# Patient Record
Sex: FEMALE | Race: WHITE | NOT HISPANIC OR LATINO
[De-identification: names, ages, dates, MRNs, and addresses within clinical notes are randomized per-mention and may not be internally consistent; named-entity substitution may affect disease eponyms.]

---
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
## 2022-01-24 ENCOUNTER — APPOINTMENT (OUTPATIENT)
Dept: PEDIATRICS | Facility: CLINIC | Age: 22
End: 2022-01-24
Payer: COMMERCIAL

## 2022-01-24 VITALS — DIASTOLIC BLOOD PRESSURE: 60 MMHG | SYSTOLIC BLOOD PRESSURE: 120 MMHG | TEMPERATURE: 98.9 F | WEIGHT: 133 LBS

## 2022-01-24 DIAGNOSIS — R31.9 HEMATURIA, UNSPECIFIED: ICD-10-CM

## 2022-01-24 DIAGNOSIS — Z87.898 PERSONAL HISTORY OF OTHER SPECIFIED CONDITIONS: ICD-10-CM

## 2022-01-24 DIAGNOSIS — R30.0 DYSURIA: ICD-10-CM

## 2022-01-24 LAB
BILIRUB UR QL STRIP: NORMAL
CLARITY UR: CLEAR
COLLECTION METHOD: NORMAL
GLUCOSE UR-MCNC: NORMAL
HCG UR QL: 0.2 EU/DL
HGB UR QL STRIP.AUTO: NORMAL
KETONES UR-MCNC: NORMAL
LEUKOCYTE ESTERASE UR QL STRIP: NORMAL
NITRITE UR QL STRIP: NORMAL
PH UR STRIP: 6
PROT UR STRIP-MCNC: NORMAL
SP GR UR STRIP: 1.01

## 2022-01-24 PROCEDURE — 81003 URINALYSIS AUTO W/O SCOPE: CPT | Mod: QW

## 2022-01-24 PROCEDURE — 99213 OFFICE O/P EST LOW 20 MIN: CPT | Mod: 25

## 2022-01-24 NOTE — DISCUSSION/SUMMARY
[FreeTextEntry1] : abx started due to high clinical suspicion\par start azo for dysuria\par drink plenty of water\par if worsening rto/ er\par ucx sent\par \par PATIENT MADE AWARE WAY OVERDUE AND NEEDS PHYSICAL HERE. \par SAYS WILL SCHEDULE ONE.

## 2022-01-24 NOTE — HISTORY OF PRESENT ILLNESS
[de-identified] : 21yr old f c/o burning when urinates for 2 days. [FreeTextEntry6] : pain on urination x 2 days\par couldn't go to class since so uncomfortable\par when voids feels like has to void again\par mild abdominal pain\par does not have her period right now\par no new vaginal discharge\par no h/o UTI\par no vaginal pain or itch

## 2022-01-24 NOTE — REVIEW OF SYSTEMS
[Dysuria] : dysuria [Hematuria] : hematuria [Vaginal Dischage] : no vaginal discharge [Vaginal Itch] : no vaginal itch [Negative] : Genitourinary

## 2022-01-25 ENCOUNTER — NON-APPOINTMENT (OUTPATIENT)
Age: 22
End: 2022-01-25

## 2022-04-22 ENCOUNTER — APPOINTMENT (OUTPATIENT)
Dept: PEDIATRICS | Facility: CLINIC | Age: 22
End: 2022-04-22
Payer: COMMERCIAL

## 2022-04-22 VITALS — TEMPERATURE: 98.3 F | WEIGHT: 132.3 LBS

## 2022-04-22 DIAGNOSIS — R10.9 UNSPECIFIED ABDOMINAL PAIN: ICD-10-CM

## 2022-04-22 PROCEDURE — 99214 OFFICE O/P EST MOD 30 MIN: CPT

## 2022-04-22 RX ORDER — CEFADROXIL 500 MG/1
500 CAPSULE ORAL TWICE DAILY
Qty: 20 | Refills: 0 | Status: DISCONTINUED | COMMUNITY
Start: 2022-01-24 | End: 2022-04-22

## 2022-04-22 RX ORDER — SULFAMETHOXAZOLE AND TRIMETHOPRIM 800; 160 MG/1; MG/1
800-160 TABLET ORAL TWICE DAILY
Qty: 20 | Refills: 0 | Status: DISCONTINUED | COMMUNITY
Start: 2022-01-25 | End: 2022-04-22

## 2022-04-22 NOTE — REVIEW OF SYSTEMS
[Appetite Changes] : no appetite changes [Vomiting] : no vomiting [Diarrhea] : no diarrhea [Constipation] : no constipation [Gaseous] : gaseous [Abdominal Pain] : abdominal pain [Negative] : Skin

## 2022-04-22 NOTE — HISTORY OF PRESENT ILLNESS
[de-identified] : as per pt stomach pains after eating food and sometimes feels the pain in her upper back. Has been going on for couple of months [FreeTextEntry6] : Central and lower abdominal pain x several months.  Sometimes the pain is sharp and travels up her back.  She is bloated  often times  and burps a lot. No vomiting, diarrhea or constipation.  No blood in stool.  Appetite normal, no weight loss. SHe eats a fairly healthy diet and has not noticed more abd pain with certain foods that she eats. SHe is not on any meds.  No travel hx.  Regular periods.

## 2022-04-22 NOTE — DISCUSSION/SUMMARY
[FreeTextEntry1] : Start daily probiotic.\par Avoid spicy foods or any other food triggers. \par GI referral for suspected IBS.

## 2022-09-08 ENCOUNTER — NON-APPOINTMENT (OUTPATIENT)
Age: 22
End: 2022-09-08

## 2023-04-20 ENCOUNTER — APPOINTMENT (OUTPATIENT)
Dept: UROLOGY | Facility: CLINIC | Age: 23
End: 2023-04-20